# Patient Record
Sex: FEMALE | Race: WHITE | URBAN - METROPOLITAN AREA
[De-identification: names, ages, dates, MRNs, and addresses within clinical notes are randomized per-mention and may not be internally consistent; named-entity substitution may affect disease eponyms.]

---

## 2021-11-11 ENCOUNTER — HOSPITAL ENCOUNTER (OUTPATIENT)
Dept: PHYSICAL THERAPY | Age: 56
Discharge: HOME | End: 2021-11-11
Payer: MEDICARE

## 2021-11-11 PROCEDURE — 97162 PT EVAL MOD COMPLEX 30 MIN: CPT | Performed by: PHYSICAL THERAPIST

## 2021-11-11 NOTE — PROGRESS NOTES
[facility] - DAILY TREATMENT NOTE    Patient Name: Krish Cutler        Date: 2021  : 1965   yes Patient  Verified  Visit #:     Insurance: Payor: Sukumar Gil / Plan: [place] B / Product Type: Medicare /      In time: 600 Out time: 640   Total Treatment Time: 40     Medicare/BCBS Time Tracking (below)   Total Timed Codes (min):  0 1:1 Treatment Time:  0     TREATMENT AREA =  Other low back pain [M54.59]    SUBJECTIVE  Pain Level (on 0 to 10 scale):  8  / 10   Medication Changes/New allergies or changes in medical history, any new surgeries or procedures?    no  If yes, update Summary List   Subjective Functional Status/Changes:  []  No changes reported     See ie          OBJECTIVE  Billed With/As:   [] TE   [] TA   [] Neuro   [] Self Care Patient Education: [x] Review HEP    [] Progressed/Changed HEP based on:   [] positioning   [] body mechanics   [] transfers   [] heat/ice application    [] other:      Other Objective/Functional Measures:    See ie     Post Treatment Pain Level (on 0 to 10) scale:   8   10     ASSESSMENT  Assessment/Changes in Function:     See ie     []  See Progress Note/Recertification   Patient will continue to benefit from skilled PT services to modify and progress therapeutic interventions, address functional mobility deficits, address ROM deficits, address strength deficits, analyze and address soft tissue restrictions, analyze and cue movement patterns, analyze and modify body mechanics/ergonomics, assess and modify postural abnormalities, address imbalance/dizziness and instruct in home and community integration to attain remaining goals. Progress toward goals / Updated goals:    See ie     PLAN  []  Upgrade activities as tolerated yes Continue plan of care   []  Discharge due to :    []  Other:      Therapist: Shamar Noe PT    Date: 2021 Time: 6:30 PM     No future appointments.

## 2021-11-11 NOTE — PROGRESS NOTES
[facility]   [facility] [address],[facility], [address] - Phone: [phone]  Fax: [phone] OF [facility] / [address] [facility]  Patient Name: Shayy Mcqueen : 1965   Medical   Diagnosis: LBP Treatment Diagnosis: Other low back pain [M54.59]   Onset Date: x6 months     Referral Source: [facility]  Start of Care [facility]): 2021   Prior Hospitalization: See medical history Provider #: [id number]   Prior Level of Function: Denies back pain, adls limited by crps   Comorbidities: CRPS, depression, anxiety, weight gain, latex allergy   Medications: Verified on Patient Summary List   The Plan of Care and following information is based on the information from the initial evaluation.   ===========================================================================================  Assessment / key information:  64 F arrives to clinic with c/o lbp radiating B LE to ankles. Reports insidious onset x6 months that has progressed to the point no longer able to bend/lift her grandchildren without 10/10 pain. Has yet to receive any treatment for this. She did have a CT and xray which shows multi-level facet arthropathy, possible l4/5 1 mm instability and disc protrusion but this was limited by spinal cord stimulator. Pt states she is pending injections but received referral for PT. Objective findings: lumbar arom limited >90% all directions, +SLR R, attempted PIVM but unable to tolerate, attempt SIJ screen but unable to tolerate. At this point pt was advised to have injections first and then return to therapy to improve tolerance.  She is to schedule appointments after this.   ===========================================================================================  Eval Complexity: History HIGH Complexity :3+ comorbidities / personal factors will impact the outcome/ POC ;  Examination  HIGH Complexity : 4+ Standardized tests and measures addressing body structure, function, activity limitation and / or participation in recreation ; Presentation HIGH Complexity : Unstable and unpredictable characteristics ; Decision Making MEDIUM Complexity : FOTO score of 26-74; Overall Complexity MEDIUM  Problem List: pain affecting function, decrease ROM, decrease strength, impaired gait/ balance, decrease ADL/ functional abilitiies, decrease activity tolerance, decrease flexibility/ joint mobility and decrease transfer abilities   Treatment Plan may include any combination of the following: Therapeutic exercise, Therapeutic activities, Neuromuscular re-education, Physical agent/modality, Gait/balance training, Manual therapy, Patient education, Self Care training and Functional mobility training  Patient / Family readiness to learn indicated by: asking questions, trying to perform skills and interest  Persons(s) to be included in education: patient (P) and family support person (FSP);list   Barriers to Learning/Limitations: yes;  physical  Measures taken, if barriers to learning:    Patient Goal (s): Reduce pain   Patient self reported health status: good  Rehabilitation Potential: good   Short Term Goals: To be accomplished in  2  weeks:  1. Compliant with hep  2. Able to actively contract transverse abdominal and multifidi in order to improve axial stability during transfers  3. Note daily max pain </=8/10 in order to incresae    Long Term Goals: To be accomplished in  4  weeks:  1. Note daily max pain </=6/10 In order to increase participation in adls  2. Increase lumbar arom flexion wfl to A with don/doff shoes  3.  Increase FOTO by 10 points in order to show functional improvement  Frequency / Duration:   Patient to be seen  2  times per week for 4  weeks:  Patient / Caregiver education and instruction: self care and activity modification  Therapist Signature: Jimmie Sheppard PT Date: 41/69/6274   Certification Period: 11/11/22-/10/22 Time: 6:30 PM   ===========================================================================================  I certify that the above Physical Therapy Services are being furnished while the patient is under my care. I agree with the treatment plan and certify that this therapy is necessary. Physician Signature:        Date:       Time:                                        Caron Bustillos NP  Please sign and return to [facility] AT [place] at [facility], [facility]. or you may fax the signed copy to [phone]. Thank you.

## 2022-01-25 NOTE — PROGRESS NOTES
[facility]   [facility] [address],[facility], [address] [facility] - Phone: [phone]  Fax: [phone]  DISCHARGE SUMMARY FOR PHYSICAL THERAPY          Patient Name: Cony Begum : 1965   Treatment/Medical Diagnosis: Other low back pain [M54.59]   Onset Date: [id number]    Referral Source: Shad Latham NP Start of Care [facility]): 21   Prior Hospitalization: See Medical History Provider #: [id number]   Prior Level of Function: Denies pain pain, adls limited by crps   Comorbidities: CRPS, depression, anxiety, weight gain    Medications: Verified on Patient Summary List   Vis[facility] from [facility]: 1 Missed Visits: 0       Key Functional Changes/Progress: pt did not return following IE and unable to perform formal reassessment. Assessments/Recommendations: Other: self discharge     If you have any questions/comments please contact us directly at [phone]. Thank you for allowing us to assist in the care of your patient.     Therapist Signature: Shannan Estrada PT Date: 22   Reporting Period: 21-22 Time: 10:58 AM